# Patient Record
Sex: MALE | Race: OTHER | HISPANIC OR LATINO
[De-identification: names, ages, dates, MRNs, and addresses within clinical notes are randomized per-mention and may not be internally consistent; named-entity substitution may affect disease eponyms.]

---

## 2021-06-17 PROBLEM — Z00.00 ENCOUNTER FOR PREVENTIVE HEALTH EXAMINATION: Status: ACTIVE | Noted: 2021-06-17

## 2021-06-24 ENCOUNTER — APPOINTMENT (OUTPATIENT)
Dept: SPINE | Facility: CLINIC | Age: 86
End: 2021-06-24
Payer: MEDICARE

## 2021-06-24 VITALS
WEIGHT: 140 LBS | OXYGEN SATURATION: 93 % | BODY MASS INDEX: 23.9 KG/M2 | HEIGHT: 64 IN | SYSTOLIC BLOOD PRESSURE: 128 MMHG | TEMPERATURE: 97.5 F | DIASTOLIC BLOOD PRESSURE: 68 MMHG | HEART RATE: 68 BPM

## 2021-06-24 DIAGNOSIS — Z78.9 OTHER SPECIFIED HEALTH STATUS: ICD-10-CM

## 2021-06-24 DIAGNOSIS — H91.93 UNSPECIFIED HEARING LOSS, BILATERAL: ICD-10-CM

## 2021-06-24 DIAGNOSIS — Z87.891 PERSONAL HISTORY OF NICOTINE DEPENDENCE: ICD-10-CM

## 2021-06-24 DIAGNOSIS — Z80.9 FAMILY HISTORY OF MALIGNANT NEOPLASM, UNSPECIFIED: ICD-10-CM

## 2021-06-24 DIAGNOSIS — Z97.4 PRESENCE OF EXTERNAL HEARING-AID: ICD-10-CM

## 2021-06-24 DIAGNOSIS — Z86.69 PERSONAL HISTORY OF OTHER DISEASES OF THE NERVOUS SYSTEM AND SENSE ORGANS: ICD-10-CM

## 2021-06-24 PROCEDURE — 99202 OFFICE O/P NEW SF 15 MIN: CPT

## 2021-06-24 NOTE — CONSULT LETTER
[Dear  ___] : Dear  [unfilled], [Consult Letter:] : I had the pleasure of evaluating your patient, [unfilled]. [Please see my note below.] : Please see my note below. [Consult Closing:] : Thank you very much for allowing me to participate in the care of this patient.  If you have any questions, please do not hesitate to contact me. [Sincerely,] : Sincerely, [FreeTextEntry2] : Dr. Pipe Holt [FreeTextEntry1] : Hiram Barakat\par 1935 [FreeTextEntry3] : NATHAN Regalado.\par Nurse Practitioner\par Neurosurgery and Spine Department\par [facility]\par

## 2021-06-24 NOTE — HISTORY OF PRESENT ILLNESS
[Other: ___] : [unfilled] [FreeTextEntry1] : Low back and right leg pain. [de-identified] : GARFIELD WADE is an 85 year old gentleman who underwent a lumbar fusion 12 years ago in [place].  He felt better after this surgery, however always had low back and right leg pain into the thigh.  He has received several epidurals with some relief.  He also had physical therapy on and off but without lasting relief.  The patient had a spinal cord stimulator trial with Dr. Betancur using an [employer] device with 70 % relief of his pain.  He is here to discuss permanent placement.  He has no images for review.  The patient speaks Lao and he and his son refused the  service.  His son is interpreting Lao for him.\par

## 2021-06-24 NOTE — PHYSICAL EXAM
[General Appearance - Alert] : alert [General Appearance - In No Acute Distress] : in no acute distress [General Appearance - Well Nourished] : well nourished [General Appearance - Well Developed] : well developed [General Appearance - Well-Appearing] : healthy appearing [] : normal voice and communication [Person] : oriented to person [Place] : oriented to place [Time] : oriented to time [Short Term Intact] : short term memory intact [Remote Intact] : remote memory intact [Span Intact] : the attention span was normal [Concentration Intact] : normal concentrating ability [Fluency] : fluency intact [Comprehension] : comprehension intact [Current Events] : adequate knowledge of current events [Past History] : adequate knowledge of personal past history [Vocabulary] : adequate range of vocabulary [Cranial Nerves Optic (II)] : visual acuity intact bilaterally,  pupils equal round and reactive to light [Cranial Nerves Oculomotor (III)] : extraocular motion intact [Cranial Nerves Trigeminal (V)] : facial sensation intact symmetrically [Cranial Nerves Facial (VII)] : face symmetrical [Cranial Nerves Vestibulocochlear (VIII)] : hearing was intact bilaterally [Cranial Nerves Glossopharyngeal (IX)] : tongue and palate midline [Cranial Nerves Accessory (XI - Cranial And Spinal)] : head turning and shoulder shrug symmetric [Cranial Nerves Hypoglossal (XII)] : there was no tongue deviation with protrusion [Motor Tone] : muscle tone was normal in all four extremities [Motor Strength] : muscle strength was normal in all four extremities [No Muscle Atrophy] : normal bulk in all four extremities [4] : L2 Iliopsoas 4/5 [5] : S1 toe walking 5/5 [Abnormal Walk] : normal gait [Sensation Tactile Decrease] : light touch was intact [Balance] : balance was intact [1+] : Patella left 1+ [0] : Ankle jerk left 0 [Past-pointing] : there was no past-pointing [Tremor] : no tremor present [___] : absent on the right [___] : absent on the left [Vera] : Vera's sign was not demonstrated [FreeTextEntry1] : He is able to step up and down from a stool.

## 2021-06-24 NOTE — REASON FOR VISIT
[New Patient Visit] : a new patient visit [Referred By: _________] : Patient was referred by EFREN [Other: _____] : [unfilled] [FreeTextEntry1] : The patient is here to discuss placement of a thoracic spinal cord stimulator.

## 2021-06-24 NOTE — ASSESSMENT
[FreeTextEntry1] : It was recommended that the patient have MRIs of the lumbar and thoracic spine to evaluate for foraminal and canal stenosis to plan for placement of the spinal cord stimulator leads.  He is to return to the office with the images for Dr. Tito Hutchins to review.

## 2021-07-28 ENCOUNTER — APPOINTMENT (OUTPATIENT)
Dept: SPINE | Facility: CLINIC | Age: 86
End: 2021-07-28
Payer: MEDICARE

## 2021-07-28 VITALS
BODY MASS INDEX: 21.51 KG/M2 | WEIGHT: 126 LBS | OXYGEN SATURATION: 95 % | DIASTOLIC BLOOD PRESSURE: 70 MMHG | HEART RATE: 69 BPM | SYSTOLIC BLOOD PRESSURE: 136 MMHG | HEIGHT: 64 IN

## 2021-07-28 PROCEDURE — 99213 OFFICE O/P EST LOW 20 MIN: CPT

## 2021-09-13 ENCOUNTER — OUTPATIENT (OUTPATIENT)
Dept: OUTPATIENT SERVICES | Facility: HOSPITAL | Age: 86
LOS: 1 days | End: 2021-09-13
Payer: COMMERCIAL

## 2021-09-13 VITALS
HEART RATE: 91 BPM | SYSTOLIC BLOOD PRESSURE: 109 MMHG | OXYGEN SATURATION: 96 % | DIASTOLIC BLOOD PRESSURE: 68 MMHG | TEMPERATURE: 97 F | WEIGHT: 130.07 LBS | HEIGHT: 62 IN | RESPIRATION RATE: 14 BRPM

## 2021-09-13 DIAGNOSIS — Z98.890 OTHER SPECIFIED POSTPROCEDURAL STATES: Chronic | ICD-10-CM

## 2021-09-13 DIAGNOSIS — M54.41 LUMBAGO WITH SCIATICA, RIGHT SIDE: ICD-10-CM

## 2021-09-13 DIAGNOSIS — Z01.818 ENCOUNTER FOR OTHER PREPROCEDURAL EXAMINATION: ICD-10-CM

## 2021-09-13 LAB
ANION GAP SERPL CALC-SCNC: 13 MMOL/L — SIGNIFICANT CHANGE UP (ref 5–17)
BUN SERPL-MCNC: 14 MG/DL — SIGNIFICANT CHANGE UP (ref 7–23)
CALCIUM SERPL-MCNC: 9.8 MG/DL — SIGNIFICANT CHANGE UP (ref 8.4–10.5)
CHLORIDE SERPL-SCNC: 101 MMOL/L — SIGNIFICANT CHANGE UP (ref 96–108)
CO2 SERPL-SCNC: 22 MMOL/L — SIGNIFICANT CHANGE UP (ref 22–31)
CREAT SERPL-MCNC: 1.11 MG/DL — SIGNIFICANT CHANGE UP (ref 0.5–1.3)
GLUCOSE SERPL-MCNC: 137 MG/DL — HIGH (ref 70–99)
HCT VFR BLD CALC: 33.8 % — LOW (ref 39–50)
HGB BLD-MCNC: 9.9 G/DL — LOW (ref 13–17)
MCHC RBC-ENTMCNC: 22.3 PG — LOW (ref 27–34)
MCHC RBC-ENTMCNC: 29.3 GM/DL — LOW (ref 32–36)
MCV RBC AUTO: 76.3 FL — LOW (ref 80–100)
NRBC # BLD: 0 /100 WBCS — SIGNIFICANT CHANGE UP (ref 0–0)
PLATELET # BLD AUTO: 238 K/UL — SIGNIFICANT CHANGE UP (ref 150–400)
POTASSIUM SERPL-MCNC: 5 MMOL/L — SIGNIFICANT CHANGE UP (ref 3.5–5.3)
POTASSIUM SERPL-SCNC: 5 MMOL/L — SIGNIFICANT CHANGE UP (ref 3.5–5.3)
RBC # BLD: 4.43 M/UL — SIGNIFICANT CHANGE UP (ref 4.2–5.8)
RBC # FLD: 15.8 % — HIGH (ref 10.3–14.5)
SODIUM SERPL-SCNC: 136 MMOL/L — SIGNIFICANT CHANGE UP (ref 135–145)
WBC # BLD: 4.46 K/UL — SIGNIFICANT CHANGE UP (ref 3.8–10.5)
WBC # FLD AUTO: 4.46 K/UL — SIGNIFICANT CHANGE UP (ref 3.8–10.5)

## 2021-09-13 PROCEDURE — 85027 COMPLETE CBC AUTOMATED: CPT

## 2021-09-13 PROCEDURE — 87641 MR-STAPH DNA AMP PROBE: CPT

## 2021-09-13 PROCEDURE — 87640 STAPH A DNA AMP PROBE: CPT

## 2021-09-13 PROCEDURE — G0463: CPT

## 2021-09-13 PROCEDURE — 80048 BASIC METABOLIC PNL TOTAL CA: CPT

## 2021-09-13 RX ORDER — LIDOCAINE HCL 20 MG/ML
0.2 VIAL (ML) INJECTION ONCE
Refills: 0 | Status: DISCONTINUED | OUTPATIENT
Start: 2021-09-23 | End: 2021-10-07

## 2021-09-13 RX ORDER — CHLORHEXIDINE GLUCONATE 213 G/1000ML
1 SOLUTION TOPICAL DAILY
Refills: 0 | Status: DISCONTINUED | OUTPATIENT
Start: 2021-09-23 | End: 2021-10-07

## 2021-09-13 RX ORDER — SODIUM CHLORIDE 9 MG/ML
3 INJECTION INTRAMUSCULAR; INTRAVENOUS; SUBCUTANEOUS EVERY 8 HOURS
Refills: 0 | Status: DISCONTINUED | OUTPATIENT
Start: 2021-09-23 | End: 2021-10-07

## 2021-09-13 NOTE — H&P PST ADULT - ACTIVITY
moderate activity at home; stairs without difficulty; walks 1-2 miles moderate activity at home, however limited d/t pain; stairs without difficulty; walks 1-2 miles

## 2021-09-13 NOTE — H&P PST ADULT - HISTORY OF PRESENT ILLNESS
85 y/o male with PMH/PSH significant for chronic lower back pain, h/o back surgery several years ago, iron deficiency anemia, hypertension, bilateral hearing loss (wears hearing aids bilaterally) presents today for presurgical testing.     COVID swab- 9/20/21 85 y/o male with PMH/PSH significant for chronic lower back pain, h/o back surgery several years ago, iron deficiency anemia, hypertension, bilateral hearing loss (wears hearing aids bilaterally) presents today for presurgical testing.  He is scheduled for a percutaneous thoracic spinal cord stimulator insertion on 9/23/2021 with Dr. Hutchins. He denies recent known COVID exposure, illness, or other injury.    COVID swab- 9/20/21  PCP clearance scheduled with Dr. ODUGLASS

## 2021-09-13 NOTE — H&P PST ADULT - NECK DETAILS
Care Everywhere: updated  Immunization: updated  Health Maintenance: updated  Media Review:   Legacy Review:   Order placed:   Upcoming appts:    
supple/no JVD

## 2021-09-13 NOTE — H&P PST ADULT - NSICDXPASTMEDICALHX_GEN_ALL_CORE_FT
PAST MEDICAL HISTORY:  Chronic lower back pain     H/O hearing loss bilateral- wears hearing aids    History of glaucoma     Hypertension     Iron deficiency anemia

## 2021-09-13 NOTE — H&P PST ADULT - NSICDXFAMILYHX_GEN_ALL_CORE_FT
FAMILY HISTORY:  Sibling  Still living? Unknown  Family history of malignant neoplasm of stomach, Age at diagnosis: Age Unknown

## 2021-09-13 NOTE — H&P PST ADULT - PROBLEM SELECTOR PLAN 1
-He is scheduled for a percutaneous thoracic spinal cord stimulator insertion on 9/23/2021 with Dr. Hutchins.  -CBC, BMP, MRSA today  -COVID swab scheduled for 9/20/21  -PCP clearance (Dr. Bermudez) scheduled  -Preop instructions provided and patient & son both stated understanding  -Surgical scrub provided, along with directions for use. Both patient and son stated understanding.  -He was advised to take amlodipine, cetrizine, and lisinopril am DOS with sip of water.

## 2021-09-13 NOTE — H&P PST ADULT - NSANTHOSAYNRD_GEN_A_CORE
No. NAZ screening performed.  STOP BANG Legend: 0-2 = LOW Risk; 3-4 = INTERMEDIATE Risk; 5-8 = HIGH Risk

## 2021-09-14 LAB
MRSA PCR RESULT.: SIGNIFICANT CHANGE UP
S AUREUS DNA NOSE QL NAA+PROBE: SIGNIFICANT CHANGE UP

## 2021-09-16 DIAGNOSIS — Z01.818 ENCOUNTER FOR OTHER PREPROCEDURAL EXAMINATION: ICD-10-CM

## 2021-09-20 ENCOUNTER — APPOINTMENT (OUTPATIENT)
Dept: DISASTER EMERGENCY | Facility: CLINIC | Age: 86
End: 2021-09-20

## 2021-09-22 ENCOUNTER — TRANSCRIPTION ENCOUNTER (OUTPATIENT)
Age: 86
End: 2021-09-22

## 2021-09-23 ENCOUNTER — OUTPATIENT (OUTPATIENT)
Dept: OUTPATIENT SERVICES | Facility: HOSPITAL | Age: 86
LOS: 1 days | End: 2021-09-23
Payer: COMMERCIAL

## 2021-09-23 ENCOUNTER — APPOINTMENT (OUTPATIENT)
Dept: SPINE | Facility: HOSPITAL | Age: 86
End: 2021-09-23

## 2021-09-23 VITALS
OXYGEN SATURATION: 98 % | DIASTOLIC BLOOD PRESSURE: 77 MMHG | RESPIRATION RATE: 18 BRPM | TEMPERATURE: 98 F | WEIGHT: 130.07 LBS | SYSTOLIC BLOOD PRESSURE: 142 MMHG | HEIGHT: 62 IN | HEART RATE: 70 BPM

## 2021-09-23 VITALS
DIASTOLIC BLOOD PRESSURE: 84 MMHG | HEART RATE: 74 BPM | RESPIRATION RATE: 16 BRPM | TEMPERATURE: 97 F | SYSTOLIC BLOOD PRESSURE: 134 MMHG | OXYGEN SATURATION: 99 %

## 2021-09-23 DIAGNOSIS — Z98.890 OTHER SPECIFIED POSTPROCEDURAL STATES: Chronic | ICD-10-CM

## 2021-09-23 DIAGNOSIS — M54.41 LUMBAGO WITH SCIATICA, RIGHT SIDE: ICD-10-CM

## 2021-09-23 PROCEDURE — 95972 ALYS CPLX SP/PN NPGT W/PRGRM: CPT | Mod: 59

## 2021-09-23 PROCEDURE — 63650 IMPLANT NEUROELECTRODES: CPT

## 2021-09-23 PROCEDURE — 76000 FLUOROSCOPY <1 HR PHYS/QHP: CPT

## 2021-09-23 PROCEDURE — 63685 INS/RPLC SPI NPG/RCVR POCKET: CPT

## 2021-09-23 PROCEDURE — 63685 INS/RPLC SPI NPG/RCVR POCKET: CPT | Mod: RT

## 2021-09-23 PROCEDURE — C1889: CPT

## 2021-09-23 PROCEDURE — C1778: CPT

## 2021-09-23 PROCEDURE — C9399: CPT

## 2021-09-23 PROCEDURE — C1820: CPT

## 2021-09-23 RX ORDER — LISINOPRIL 2.5 MG/1
1 TABLET ORAL
Qty: 0 | Refills: 0 | DISCHARGE

## 2021-09-23 RX ORDER — SODIUM CHLORIDE 9 MG/ML
1000 INJECTION, SOLUTION INTRAVENOUS
Refills: 0 | Status: DISCONTINUED | OUTPATIENT
Start: 2021-09-23 | End: 2021-10-07

## 2021-09-23 RX ORDER — CEFAZOLIN SODIUM 1 G
2000 VIAL (EA) INJECTION ONCE
Refills: 0 | Status: COMPLETED | OUTPATIENT
Start: 2021-09-23 | End: 2021-09-23

## 2021-09-23 RX ORDER — OXYCODONE HYDROCHLORIDE 5 MG/1
1 TABLET ORAL
Qty: 12 | Refills: 0
Start: 2021-09-23 | End: 2021-09-26

## 2021-09-23 RX ORDER — ONDANSETRON 8 MG/1
4 TABLET, FILM COATED ORAL ONCE
Refills: 0 | Status: DISCONTINUED | OUTPATIENT
Start: 2021-09-23 | End: 2021-10-07

## 2021-09-23 RX ORDER — CETIRIZINE HYDROCHLORIDE 10 MG/1
1 TABLET ORAL
Qty: 0 | Refills: 0 | DISCHARGE

## 2021-09-23 RX ORDER — AMLODIPINE BESYLATE 2.5 MG/1
1 TABLET ORAL
Qty: 0 | Refills: 0 | DISCHARGE

## 2021-09-23 RX ORDER — CEPHALEXIN 500 MG
1 CAPSULE ORAL
Qty: 10 | Refills: 0
Start: 2021-09-23 | End: 2021-09-27

## 2021-09-23 RX ORDER — HYDROMORPHONE HYDROCHLORIDE 2 MG/ML
0.2 INJECTION INTRAMUSCULAR; INTRAVENOUS; SUBCUTANEOUS
Refills: 0 | Status: DISCONTINUED | OUTPATIENT
Start: 2021-09-23 | End: 2021-09-23

## 2021-09-23 RX ADMIN — CHLORHEXIDINE GLUCONATE 1 APPLICATION(S): 213 SOLUTION TOPICAL at 09:09

## 2021-09-23 NOTE — ASU DISCHARGE PLAN (ADULT/PEDIATRIC) - CARE PROVIDER_API CALL
Tito Hutchins (MD)  Neurosurgery  [address], [address]  [place] [address]  Phone: [phone]  Fax: [phone]  Follow Up Time:

## 2021-09-23 NOTE — BRIEF OPERATIVE NOTE - OPERATION/FINDINGS
midline thoracic incision made, b/l SCS placed [facility]. JUDES to T7 vertebrae, R battery. Closed with monocryl.

## 2021-09-23 NOTE — ASU DISCHARGE PLAN (ADULT/PEDIATRIC) - ASU DC SPECIAL INSTRUCTIONSFT
Spinal cord stimulator and associated battery placed. Should be programmed in recovery area as preliminary programming. 2 incision, midline back, and R buttock, both incisions are closed with absorbable sutures. Steri strips on incision will fall off on their own. Ok to shower after 3 days postoperatively. Please take antibiotics as prescribed (5 days) and pain medication (oxycodone) has been ordered as needed for 4 days.     Follow up in the office with Dr. Hutchins in 7-10 days for incision check.

## 2021-09-23 NOTE — ASU DISCHARGE PLAN (ADULT/PEDIATRIC) - NURSING INSTRUCTIONS
take next dose of tylenol if in pain at 5'36pm today take next dose of Tylenol if in pain at 5:36pm today

## 2021-09-27 PROBLEM — Z86.69 PERSONAL HISTORY OF OTHER DISEASES OF THE NERVOUS SYSTEM AND SENSE ORGANS: Chronic | Status: ACTIVE | Noted: 2021-09-13

## 2021-09-27 PROBLEM — I10 ESSENTIAL (PRIMARY) HYPERTENSION: Chronic | Status: ACTIVE | Noted: 2021-09-13

## 2021-09-27 PROBLEM — D50.9 IRON DEFICIENCY ANEMIA, UNSPECIFIED: Chronic | Status: ACTIVE | Noted: 2021-09-13

## 2021-09-27 PROBLEM — M54.5 LOW BACK PAIN: Chronic | Status: ACTIVE | Noted: 2021-09-13

## 2021-09-28 ENCOUNTER — NON-APPOINTMENT (OUTPATIENT)
Age: 86
End: 2021-09-28

## 2021-10-01 ENCOUNTER — APPOINTMENT (OUTPATIENT)
Dept: SPINE | Facility: CLINIC | Age: 86
End: 2021-10-01
Payer: MEDICARE

## 2021-10-01 VITALS
SYSTOLIC BLOOD PRESSURE: 134 MMHG | OXYGEN SATURATION: 94 % | BODY MASS INDEX: 22.71 KG/M2 | WEIGHT: 133 LBS | DIASTOLIC BLOOD PRESSURE: 72 MMHG | HEIGHT: 64 IN | HEART RATE: 87 BPM

## 2021-10-01 PROCEDURE — 99024 POSTOP FOLLOW-UP VISIT: CPT

## 2021-10-01 PROCEDURE — 95972 ALYS CPLX SP/PN NPGT W/PRGRM: CPT

## 2021-10-01 NOTE — ASSESSMENT
[FreeTextEntry1] : The steri strips were removed from the thoracic incision.  The steri strips were left on the right buttock incision to promote further healing.  The patient is to keep the incisions clean and dry.  He may shower and pat the incisions dry gently.  He is to wear loose clothing and is to return to the office in one week for a wound check.  The patient met with the [employer] spinal cord stimulator representative and had education regarding the stimulator.  The stimulator underwent complex programming making changes to the millivolt frequency and pulse width resulting in good coverage of the areas of his pain. He is to also follow up with his pain specialist, Dr. Pipe Holt.

## 2021-10-01 NOTE — PHYSICAL EXAM
[General Appearance - Alert] : alert [General Appearance - In No Acute Distress] : in no acute distress [General Appearance - Well Nourished] : well nourished [General Appearance - Well Developed] : well developed [General Appearance - Well-Appearing] : healthy appearing [] : normal voice and communication [Clean] : clean [Dry] : dry [Healing Well] : healing well [Intact] : intact [No Drainage] : without drainage [Normal Skin] : normal [Normal Skin Turgor] : skin turgor was normal [FreeTextEntry1] : Thoracic and right buttock incisions. [FreeTextEntry6] : Steri strips

## 2021-10-01 NOTE — REASON FOR VISIT
[de-identified] : Percutaneous placement of right and left percutaneous spinal cord stimulator electrode arrays.  Placement of a [employer] Chavez spinal cord stimulator battery and complex programming of the battery.  Dr. Tito Hutchins M.D. [de-identified] : 09/23/2021 [de-identified] : 8 [de-identified] : 1 [de-identified] : The patient stated that he had significant incisional pain yesterday but is feeling well today. He has lower back pain with radiation into his extremities.  The patient is here for a wound check and to meet with the spinal cord stimulator representative for education on and reprogramming of the stimulator. [Other: _____] : [unfilled]

## 2021-10-01 NOTE — HISTORY OF PRESENT ILLNESS
[FreeTextEntry1] : GARFIELD WADE is a 86 year old gentleman who had undergone an L4-L5 fusion about 12 years ago in [facility].  He did well from that but began to have back pain.  He now has low back pain which radiates into his lower extremities.  The patient underwent a spinal cord stimulator trial with 70% relief of his pain.  He underwent permanent placement of the [employer] Gir[employer], Chavez device on 09/23/2021.\par

## 2021-10-01 NOTE — CONSULT LETTER
[Dear  ___] : Dear  [unfilled], [Courtesy Letter:] : I had the pleasure of seeing your patient, [unfilled], in my office today. [Please see my note below.] : Please see my note below. [Consult Closing:] : Thank you very much for allowing me to participate in the care of this patient.  If you have any questions, please do not hesitate to contact me. [Sincerely,] : Sincerely, [FreeTextEntry2] : Pipe Holt M.D. [FreeTextEntry1] : Hiram Barakat\par  1935 [FreeTextEntry3] : SAM Regalado\par Nurse Practitioner\par Neurosurgery and Spine Department\par [facility]\par Nurse practitioner in the office with Dr. Tito Hutchins M.D.

## 2021-10-08 ENCOUNTER — APPOINTMENT (OUTPATIENT)
Dept: SPINE | Facility: CLINIC | Age: 86
End: 2021-10-08
Payer: MEDICARE

## 2021-10-08 VITALS
OXYGEN SATURATION: 97 % | HEART RATE: 71 BPM | BODY MASS INDEX: 22.71 KG/M2 | WEIGHT: 133 LBS | TEMPERATURE: 97.6 F | HEIGHT: 64 IN | RESPIRATION RATE: 16 BRPM | DIASTOLIC BLOOD PRESSURE: 68 MMHG | SYSTOLIC BLOOD PRESSURE: 131 MMHG

## 2021-10-08 DIAGNOSIS — G89.29 LUMBAGO WITH SCIATICA, RIGHT SIDE: ICD-10-CM

## 2021-10-08 DIAGNOSIS — M54.41 LUMBAGO WITH SCIATICA, RIGHT SIDE: ICD-10-CM

## 2021-10-08 PROCEDURE — 99212 OFFICE O/P EST SF 10 MIN: CPT

## 2021-10-08 NOTE — ASSESSMENT
[FreeTextEntry1] : The suture knot at the bottom of the thoracic incision was clipped.  The area was cleaned and a dry sterile gauze was applied.  Wound care and activity levels were discussed.  The patient is to avoid bending and lifting anything heavy.  He is to contact the [employer] representative if he needs reprogramming of the stimulator.  He may return to the office on an as needed basis.

## 2021-10-08 NOTE — PHYSICAL EXAM
[Clean] : clean [Dry] : dry [Healing Well] : healing well [Intact] : intact [No Drainage] : without drainage [Normal Skin] : normal [Erythema] : not erythematous [Warm] : not warm [Normal Skin Turgor] : skin turgor was normal [FreeTextEntry6] : There is a small Mylicon suture knot at the bottom of the thoracic incision. [FreeTextEntry1] : Thoracic and right buttock incisions.

## 2021-10-08 NOTE — REASON FOR VISIT
[Follow-Up: _____] : a [unfilled] follow-up visit [Other: _____] : [unfilled] [FreeTextEntry1] : GARFIELD WADE is a 86 year old gentleman who underwent permanent placement of an [employer] spinal cord stimulator on 09/23/2021.  The patient is here for a wound check.  He stated that the stimulator is working well and he has little back or leg pain however, his lower thoracic incision is giving him pain which he states is 8 on a scale from 0-10.\par

## 2022-01-15 ENCOUNTER — TRANSCRIPTION ENCOUNTER (OUTPATIENT)
Age: 87
End: 2022-01-15

## 2022-05-16 NOTE — ASU PATIENT PROFILE, ADULT - NSICDXPASTSURGICALHX_GEN_ALL_CORE_FT
details… PAST SURGICAL HISTORY:  History of gastric surgery     Previous back surgery lumbar spine

## 2022-08-27 ENCOUNTER — EMERGENCY (EMERGENCY)
Facility: HOSPITAL | Age: 87
LOS: 1 days | Discharge: HOME | End: 2022-08-27
Attending: EMERGENCY MEDICINE
Payer: COMMERCIAL

## 2022-08-27 VITALS
HEIGHT: 62 IN | HEART RATE: 61 BPM | WEIGHT: 132.06 LBS | SYSTOLIC BLOOD PRESSURE: 108 MMHG | TEMPERATURE: 98 F | RESPIRATION RATE: 18 BRPM | OXYGEN SATURATION: 97 % | DIASTOLIC BLOOD PRESSURE: 65 MMHG

## 2022-08-27 DIAGNOSIS — Z98.890 OTHER SPECIFIED POSTPROCEDURAL STATES: Chronic | ICD-10-CM

## 2022-08-27 PROCEDURE — 73030 X-RAY EXAM OF SHOULDER: CPT | Mod: 26,RT

## 2022-08-27 PROCEDURE — 99284 EMERGENCY DEPT VISIT MOD MDM: CPT

## 2022-08-27 PROCEDURE — 73030 X-RAY EXAM OF SHOULDER: CPT

## 2022-08-27 PROCEDURE — 99283 EMERGENCY DEPT VISIT LOW MDM: CPT | Mod: 25

## 2022-08-27 RX ORDER — LIDOCAINE 4 G/100G
1 CREAM TOPICAL ONCE
Refills: 0 | Status: COMPLETED | OUTPATIENT
Start: 2022-08-27 | End: 2022-08-27

## 2022-08-27 RX ADMIN — LIDOCAINE 1 PATCH: 4 CREAM TOPICAL at 12:33

## 2022-08-27 NOTE — ED PROVIDER NOTE - NS ED ATTENDING STATEMENT MOD
This was a shared visit with the LEANDRO. I reviewed and verified the documentation and independently performed the documented:

## 2022-08-27 NOTE — ED PROVIDER NOTE - ATTENDING APP SHARED VISIT CONTRIBUTION OF CARE
Attending Statement (JAN Miranda MD):    HPI: 86y/o M with h/o HTN, recent covid (diagnosed 8/16; no fever recently; cough improving), c/o right shoulder pain for the past 4 days; no fever, denies trauma.  He noticed worsening pain with movement and radiating pain to humerus. Denies elbow pain. Denies headache, neck or back pain. Denies sensory changes or weakness to extremities. Denies fever, chills, sore throat, or SOB. Denies CP/ABD pain or N/V/D.    Review of Systems:  -General: no fever or chills  -ENT: no congestion, no difficulty swallowing  -Pulmonary: no cough, no shortness of breath  -Cardiac: no chest pain, no palpitations  -Gastrointestinal: no abdominal pain, no nausea, no vomiting, and no diarrhea.  -Genitourinary: no blood or pain with urination  -Musculoskeletal: see hpi  -Skin: no rashes  -Endocrine: No h/o diabetes  -Neurologic: No new weakness or numbness in extremities    All else negative unless otherwise specified elsewhere in this note.    PSH/PMH as noted above    On Physical Exam:  General: well appearing, in NAD, speaking clearly in full sentences and without difficulty; cooperative with exam  HEENT: anicteric sclera, airway patent  Neck: no neck tenderness, no nuchal rigidity  Cardiac: normal s1, s2; RRR; no MGR  Lungs: CTABL  Abdomen: soft nontender/nondistended  : no bladder tenderness or distension  Skin: intact, no rash  Extremities: no peripheral edema, no gross deformities; R shoulder nontender, FROM of R shoulder; R radial pulse palpable, sensation intact throughout RUE; R shoulder with no erythema, swelling or warmth      MDM: obtain R shoulder xrays, do not anticipate fracture, more likely arthritic changes or soft tissue injuries; no evidence of infection on exam. ancipitate discharge after xrays.

## 2022-08-27 NOTE — ED PROVIDER NOTE - NSFOLLOWUPINSTRUCTIONS_ED_ALL_ED_FT
Please see the information of shoulder pain.    Keep continue your current medications as prescribed and take Tylenol (2 tablets of 500mg) every 8hours for pain as needed.    Salonpas with Lidocaine patch to pain area as package directed.    Follow up with orthopedist Dr. Reynolds for reevaluation, call Monday for appointment.    Return for any concerns, fever, numbness, weakness, or worsening pain.

## 2022-08-27 NOTE — ED PROVIDER NOTE - OBJECTIVE STATEMENT
88yo male pt with PMHx of HTN, Recent COVID infection (8/16/2022), PSHx of Lumbar Fusion (15years ago) presents to ED with right dominant shoulder pain for several days. Reports he's had right shoulder pain, no injury or heavy lifting, about 10days ago and was evaluated by PMD. Pt took Naproxen prescribed by PMD without pain relief. He went to  and recommended pt to discontinue Naproxen (was told too strong for pt) and take Tylenol. Pt took 2 Tylenol without improvement and came to ED for reevaluation. He's not had x-ray yet. He noticed worsening pain with movement and radiating pain to humerus. Denies elbow pain. Denies headache, neck or back pain. Denies sensory changes or weakness to extremities. Denies fever, chills, sore throat, or SOB. Denies CP/ABD pain or N/V/D. 88yo male pt with PMHx of HTN, Recent COVID infection (8/16/2022), PSHx of Lumbar Fusion (15years ago) and spinal cord stimulator implanted (2021) presents to ED with right dominant shoulder pain for several days. Reports he's had right shoulder pain, no injury or heavy lifting, about 10days ago and was evaluated by PMD. Pt took Naproxen prescribed by PMD without pain relief. He went to  and recommended pt to discontinue Naproxen (was told too strong for pt) and take Tylenol. Pt took 2 Tylenol without improvement and came to ED for reevaluation. He's not had x-ray yet. He noticed worsening pain with movement and radiating pain to humerus. Denies elbow pain. Denies headache, neck or back pain. Denies sensory changes or weakness to extremities. Denies fever, chills, sore throat, or SOB. Denies CP/ABD pain or N/V/D.

## 2022-08-27 NOTE — ED PROVIDER NOTE - PATIENT PORTAL LINK FT
You can access the FollowMyHealth Patient Portal offered by [facility] by registering at the following website: [email or web address]. By joining [facility]’s FollowMyHealth portal, you will also be able to view your health information using other applications (apps) compatible with our system.

## 2022-08-27 NOTE — ED PROVIDER NOTE - PHYSICAL EXAMINATION
NAD. VSS. Afebrile. Neck supple. No spinal tender. Lungs clear. ABD soft, non tender. +Right shoulder; generalized tender, mild swelling and diminished ROMs. N/V- intact. No deformity.

## 2022-08-27 NOTE — ED PROVIDER NOTE - CARE PROVIDER_API CALL
Scotty Reynolds)  Orthopaedic Surgery  [address]  [place] [address]  Phone: [phone]  Fax: [phone]  Follow Up Time:

## 2022-08-27 NOTE — ED ADULT NURSE NOTE - OBJECTIVE STATEMENT
Pt presents to ED reporting R sided shoulder pain, worsened with movement and palpation, AXOX3, [place] speaking, daughter Keily at bedside translating, no injury reported , no focal weakness reported, no nausea vomiting or diarrhea, breathing unlabored, symmetrical, no shortness of breath, pt denies chest pain

## 2023-12-05 ENCOUNTER — NON-APPOINTMENT (OUTPATIENT)
Age: 88
End: 2023-12-05

## 2024-03-12 NOTE — H&P PST ADULT - ASSESSMENT
No
He is scheduled for a percutaneous thoracic spinal cord stimulator insertion on 9/23/2021 with Dr. Hutchins.